# Patient Record
Sex: MALE
[De-identification: names, ages, dates, MRNs, and addresses within clinical notes are randomized per-mention and may not be internally consistent; named-entity substitution may affect disease eponyms.]

---

## 2023-07-27 PROBLEM — Z00.00 ENCOUNTER FOR PREVENTIVE HEALTH EXAMINATION: Status: ACTIVE | Noted: 2023-07-27

## 2023-08-03 ENCOUNTER — APPOINTMENT (OUTPATIENT)
Dept: NEUROSURGERY | Facility: CLINIC | Age: 48
End: 2023-08-03
Payer: OTHER MISCELLANEOUS

## 2023-08-03 VITALS — HEIGHT: 67 IN | BODY MASS INDEX: 31.39 KG/M2 | WEIGHT: 200 LBS

## 2023-08-03 PROCEDURE — 99204 OFFICE O/P NEW MOD 45 MIN: CPT

## 2023-08-03 RX ORDER — DIAZEPAM 5 MG/1
5 TABLET ORAL
Refills: 0 | Status: ACTIVE | COMMUNITY

## 2023-08-03 RX ORDER — AMITRIPTYLINE HYDROCHLORIDE 25 MG/1
25 TABLET, FILM COATED ORAL
Refills: 0 | Status: ACTIVE | COMMUNITY

## 2023-08-03 RX ORDER — HYDROMORPHONE HYDROCHLORIDE 4 MG/1
4 TABLET ORAL
Refills: 0 | Status: ACTIVE | COMMUNITY

## 2023-08-03 RX ORDER — OXYCODONE 10 MG/1
10 TABLET ORAL
Refills: 0 | Status: ACTIVE | COMMUNITY

## 2023-08-03 RX ORDER — PREGABALIN 150 MG/1
150 CAPSULE ORAL
Refills: 0 | Status: ACTIVE | COMMUNITY

## 2023-08-03 NOTE — HISTORY OF PRESENT ILLNESS
[FreeTextEntry1] : left groin pain  [de-identified] : Mr. Bush is a 48 yrs old male, PSH cervical spine (C5-C7) fusion by Dr. Rivera Cabrera at [facility] today after sustaining a work- related injury on 7/30/22, he was pouring chemicals in a bucket, when some of the chemical fell to the floor; and had a slip and fall causing injury to his neck, back and 2nd degree burn on his left foot. Mr. Pnea, reports of Lower back pain radiating to the posterior leg to the calf, and left groin pain. He is experiencing numbness on the lateral left thigh, tingling on the feet. and weakness on the left leg. Has fallen 3x times. He also endorses of his back locking, for 10 sec, during those seconds he is unable to move.   Mr. Bush, has been attending PT 2x a week for since May 2023, and prior to that he was going to [place] for 2 months, which helped for a brief- time.  HE sees Dr. Liban Medrano ( [facility]) who has given him  LESI x2, which did not help, and a Brach Block which helped for a week. Symptom is aggravated with prolong standing and seated, and it is alleviated by laying down.   DIAGNOSTIC TESTING: MRI of the lumbar spine done at [facility] in 5/2023 showed L5-S1, bilateral foraminal stenosis, worse on the right. no severe stenosis or cord compression.  EMG of LE on 11/2022 demonstrated Left L2-3 radiculopathy  On physical exam:  Constitutional: Well appearing, in discomfort HEENT: Normocephalic Atraumatic Psychiatric: Alert and oriented to all spheres, normal mood Pulmonary: no respiratory distress Abdomen: non-distended Vascular/Extremities: no edema, no cyanosis, no clubbing   Neurologic:  CN II-XII grossly intact ROM: minimal in LS spine due to pain  Palpation:  pain to palpation in lumbar spine, point tenderness at left hip Strength: Full strength in all major muscle groups, no atrophy, except LLE 4/5 ( limited to left sided groin pain) Sensation: Full sensation to light touch in all extremities Reflexes:   2+ patellar  2+ biceps  2+ ankle jerk  No Bradford's  No clonus  No babinski  Signs: SLR negative  Gait: antalgic

## 2023-08-03 NOTE — REASON FOR VISIT
[New Patient Visit] : a new patient visit [Spouse] : spouse [Referred By: _________] : Patient was referred by [facility]

## 2023-08-03 NOTE — PLAN
[FreeTextEntry1] : Mr. Bush, presents today after sustaining a work-related injury on 7/30/22, injuring his lower back radiating to the left posterior leg, and left groin pain. Discuss his MRI results which showed L5-S1 left foraminal stenosis, which seems to correlate with the lower extremity posterior radiating pain, however on exam it is clear that he has pain of primarily hip origin. This pain has been severe and present since the time f injury and I believe not adequately reviewed. I am ordering an MRI hip, left to rule out for any hip abnormalities. Ordering xray lumbar ( w/4 views) to assess for instability given the spondylolisthesis I see at L5-S1.  He will follow up after imaging is complete to delineate a plan.

## 2023-09-11 ENCOUNTER — APPOINTMENT (OUTPATIENT)
Dept: NEUROSURGERY | Facility: CLINIC | Age: 48
End: 2023-09-11
Payer: OTHER MISCELLANEOUS

## 2023-09-11 VITALS — BODY MASS INDEX: 31.39 KG/M2 | WEIGHT: 200 LBS | HEIGHT: 67 IN

## 2023-09-11 DIAGNOSIS — Z78.9 OTHER SPECIFIED HEALTH STATUS: ICD-10-CM

## 2023-09-11 PROCEDURE — 99214 OFFICE O/P EST MOD 30 MIN: CPT

## 2023-10-16 ENCOUNTER — APPOINTMENT (OUTPATIENT)
Dept: NEUROSURGERY | Facility: CLINIC | Age: 48
End: 2023-10-16
Payer: OTHER MISCELLANEOUS

## 2023-10-16 VITALS — BODY MASS INDEX: 31.39 KG/M2 | WEIGHT: 200 LBS | HEIGHT: 67 IN

## 2023-10-16 DIAGNOSIS — M48.061 SPINAL STENOSIS, LUMBAR REGION WITHOUT NEUROGENIC CLAUDICATION: ICD-10-CM

## 2023-10-16 DIAGNOSIS — R29.898 OTHER SYMPTOMS AND SIGNS INVOLVING THE MUSCULOSKELETAL SYSTEM: ICD-10-CM

## 2023-10-16 PROCEDURE — 99214 OFFICE O/P EST MOD 30 MIN: CPT

## 2023-11-27 ENCOUNTER — APPOINTMENT (OUTPATIENT)
Dept: NEUROSURGERY | Facility: CLINIC | Age: 48
End: 2023-11-27
Payer: OTHER MISCELLANEOUS

## 2023-11-27 VITALS — HEIGHT: 67 IN | WEIGHT: 200 LBS | BODY MASS INDEX: 31.39 KG/M2

## 2023-11-27 DIAGNOSIS — G89.4 CHRONIC PAIN SYNDROME: ICD-10-CM

## 2023-11-27 DIAGNOSIS — M25.552 PAIN IN LEFT HIP: ICD-10-CM

## 2023-11-27 PROCEDURE — 99213 OFFICE O/P EST LOW 20 MIN: CPT
